# Patient Record
Sex: MALE | Race: WHITE
[De-identification: names, ages, dates, MRNs, and addresses within clinical notes are randomized per-mention and may not be internally consistent; named-entity substitution may affect disease eponyms.]

---

## 2018-09-20 ENCOUNTER — HOSPITAL ENCOUNTER (EMERGENCY)
Dept: HOSPITAL 56 - MW.ED | Age: 28
Discharge: HOME | End: 2018-09-20
Payer: COMMERCIAL

## 2018-09-20 DIAGNOSIS — R59.0: ICD-10-CM

## 2018-09-20 DIAGNOSIS — J02.9: Primary | ICD-10-CM

## 2018-09-20 NOTE — EDM.PDOC
ED HPI GENERAL MEDICAL PROBLEM





- General


Stated Complaint: THINKS HE HAS STREP


Time Seen by Provider: 09/20/18 22:00





- History of Present Illness


INITIAL COMMENTS - FREE TEXT/NARRATIVE: 





HISTORY AND PHYSICAL:





History of present illness:


The patient is a healthy 27-year-old male who presents with 2-3 days of sore 

throat and having chills and is concerned about pharyngitis or strep throat. He 

has a new baby at home and is very concerned. He has not been coughing vomiting 

or having stomach pain and no chest pain or shortness of breath. He says is 

painful to swallow.





Review of systems: 


As per history of present illness and below otherwise all systems reviewed and 

negative.





Past medical history: 


As per history of present illness and as reviewed below otherwise 

noncontributory.





Surgical history: 


As per history of present illness and as reviewed below otherwise 

noncontributory.





Social history: 


No reported history of drug or alcohol abuse.





Family history: 


As per history of present illness and as reviewed below otherwise 

noncontributory.





Physical exam:


General: Well-developed well-nourished overweight man who is nontoxic and vital 

signs are noted by me. Is no evidence of any facial swelling. He is speaking 

clearly in the ED


HEENT: Atraumatic, normocephalic, pupils reactive, negative for conjunctival 

pallor or scleral icterus, mucous membranes moist, throat clear of exudates but 

there is profound posterior oropharyngeal erythema and the tonsils are regressed

, uvula is midline, neck supple, nontender, trachea midline. There is anterior 

cervical adenopathy but no nuchal rigidity


Lungs: Clear to auscultation, breath sounds equal bilaterally, chest nontender.


Heart: S1S2, regular rate and rhythm no overt murmurs


Abdomen: Soft, nondistended, nontender. NABS


Pelvis: Stable nontender.


Genitourinary: Deferred.


Rectal: Deferred.


Extremities: Atraumatic, full range of motion without defects or deficits. 

Neurovascular unremarkable.


Neuro: Awake, alert, oriented. Cranial nerves II through XII unremarkable. 

Cerebellum unremarkable. Motor and sensory unremarkable throughout. Exam 

nonfocal.





Diagnostics:


[]





Therapeutics:


[]





Impression: 


Pharyngitis with cervical adenopathy





Definitive disposition and diagnosis as appropriate pending reevaluation and 

review of above.


  ** Throat


Pain Score (Numeric/FACES): 9





- Related Data


 Allergies











Allergy/AdvReac Type Severity Reaction Status Date / Time


 


No Known Allergies Allergy   Verified 09/20/18 22:02











Home Meds: 


 Home Meds





. [No Known Home Meds]  09/20/18 [History]











Past Medical History





- Past Health History


Medical/Surgical History: Denies Medical/Surgical History





ED ROS GENERAL





- Review of Systems


Review Of Systems: ROS reveals no pertinent complaints other than HPI.





ED EXAM, GENERAL





- Physical Exam


Exam: See Below (See dictation)





Course





- Vital Signs


Last Recorded V/S: 





 Last Vital Signs











Temp  37.3 C   09/20/18 21:58


 


Pulse  104 H  09/20/18 21:58


 


Resp  20   09/20/18 21:58


 


BP  148/94 H  09/20/18 21:58


 


Pulse Ox  99   09/20/18 21:58














Departure





- Departure


Time of Disposition: 22:04


Disposition: Home, Self-Care 01


Condition: Good


Clinical Impression: 


 Cervical lymphadenopathy





Pharyngitis


Qualifiers:


 Pharyngitis/tonsillitis etiology: unspecified etiology Qualified Code(s): 

J02.9 - Acute pharyngitis, unspecified








- Discharge Information


Referrals: 


PCP,None [Primary Care Provider] - 


Additional Instructions: 


The following information is given to patients seen in the emergency department 

who are being discharged to home. This information is to outline your options 

for follow-up care. We provide all patients seen in our emergency department 

with a follow-up referral.





The need for follow-up, as well as the timing and circumstances, are variable 

depending upon the specifics of your emergency department visit.





If you don't have a primary care physician on staff, we will provide you with a 

referral. We always advise you to contact your personal physician following an 

emergency department visit to inform them of the circumstance of the visit and 

for follow-up with them and/or the need for any referrals to a consulting 

specialist.





The emergency department will also refer you to a specialist when appropriate. 

This referral assures that you have the opportunity for followup care with a 

specialist. All of these measure are taken in an effort to provide you with 

optimal care, which includes your followup.





Under all circumstances we always encourage you to contact your private 

physician who remains a resource for coordinating  your care. When calling for 

followup care, please make the office aware that this follow-up is from your 

recent emergency room visit. If for any reason you are refused follow-up, 

please contact the [facility] emergency 

department at [phone] and ask to speak to the emergency department 

charge nurse.





[facility] 


Primary care- Internal Medicine and Family [address] [address]


[phone]








Push hydration and use over-the-counter Tylenol or ibuprofen for pain and 

fevers. Please take in about except until finished. You have been prescribed 

Augmentin from [facility]. The pill is a large pelvic you may break it in half 

in order to swallow it. Please call and schedule a follow-up appointment with 

Dr. patricio at the clinic or one of our clinic providers for reevaluation and 

further care.